# Patient Record
Sex: MALE | Race: WHITE | Employment: UNEMPLOYED | URBAN - METROPOLITAN AREA
[De-identification: names, ages, dates, MRNs, and addresses within clinical notes are randomized per-mention and may not be internally consistent; named-entity substitution may affect disease eponyms.]

---

## 2018-04-26 ENCOUNTER — HOSPITAL ENCOUNTER (EMERGENCY)
Age: 48
Discharge: HOME | End: 2018-04-26
Attending: EMERGENCY MEDICINE
Payer: SELF-PAY

## 2018-04-26 VITALS
OXYGEN SATURATION: 96 % | SYSTOLIC BLOOD PRESSURE: 144 MMHG | DIASTOLIC BLOOD PRESSURE: 108 MMHG | HEIGHT: 71 IN | BODY MASS INDEX: 26.65 KG/M2 | TEMPERATURE: 98 F | HEART RATE: 120 BPM | RESPIRATION RATE: 16 BRPM | WEIGHT: 190.38 LBS

## 2018-04-26 DIAGNOSIS — R06.02 SOB (SHORTNESS OF BREATH): Primary | ICD-10-CM

## 2018-04-26 DIAGNOSIS — R19.7 DIARRHEA, UNSPECIFIED TYPE: ICD-10-CM

## 2018-04-26 DIAGNOSIS — R25.2 MUSCLE CRAMPS: ICD-10-CM

## 2018-04-26 DIAGNOSIS — F10.10 ALCOHOL ABUSE: ICD-10-CM

## 2018-04-26 PROCEDURE — 75810000275 HC EMERGENCY DEPT VISIT NO LEVEL OF CARE

## 2018-04-26 NOTE — ED TRIAGE NOTES
Pt escorted back to room, md in with pt, pt stated if we tested his alcohol level it would be .4 something

## 2018-04-26 NOTE — Clinical Note
YOU ARE LEAVING AFTER DECLINING ANY FURTHER WORKUP OR EVALUATION BY US.  YOU SAW THE COUNSELOR WHO HAS PROVIDED YOU WITH RESOURCES TO SEEK HELP. YOU MAY RETURN TO THE ER AT ANY POINT FOR FURTHER EVALUATION.

## 2018-04-26 NOTE — ED NOTES
Sat with patient for a extended point of time trying to get pt to consent to blood work, fluids and treatment. Pt provided resources. Pt declined to stay and ambulated with mother.

## 2018-04-26 NOTE — ED TRIAGE NOTES
Pt refusing to stay, mother taking pt to [facility], pt walked out and talking to mother outside

## 2018-04-26 NOTE — ED PROVIDER NOTES
HPI Comments: 56 y.o. male with past medical history significant for HTN and elevated liver enzymes who presents from home via private vehicle for evaluation of alcohol problem. Pt reports that he is an alcoholic who has been \"harming (him)self\" (drinking) since 4/6/18, for the last 20 days. He states he is harming himself by drinking too much and baljit drinking. He notes drinking \"quite a few\" four loco's and that he last drank this morning. He reports having diarrhea, muscle cramping, and SOB (DRAPER when walking). He is concerned that he is \"bleeding out\" but has not checked his stool for signs of blood. No hematemesis. Pt expresses concern for liver failure. He denies having any medicinal allergies and is on no medications. Pt denies having any SI or HI. There are no other acute medical concerns at this time. Social Hx: Pt denies smoking and reports no illicit drug use. Note written by Carole Fernandez, as dictated by Brayden Briseno MD 8:27 AM    The history is provided by the patient. No  was used. Past Medical History:   Diagnosis Date    Elevated liver enzymes     Hypertension        History reviewed. No pertinent surgical history. History reviewed. No pertinent family history. Social History     Social History    Marital status: N/A     Spouse name: N/A    Number of children: N/A    Years of education: N/A     Occupational History    Not on file. Social History Main Topics    Smoking status: Not on file    Smokeless tobacco: Not on file    Alcohol use Not on file    Drug use: Not on file    Sexual activity: Not on file     Other Topics Concern    Not on file     Social History Narrative    No narrative on file         ALLERGIES: Review of patient's allergies indicates no known allergies. Review of Systems   Respiratory: Positive for shortness of breath. Gastrointestinal: Positive for diarrhea. Negative for vomiting. Musculoskeletal: Positive for myalgias. Psychiatric/Behavioral: Negative for suicidal ideas. All other systems reviewed and are negative. Vitals:    04/26/18 0809   BP: (!) 144/108   Pulse: (!) 120   Resp: 16   Temp: 98 °F (36.7 °C)   SpO2: 96%   Weight: 86.4 kg (190 lb 6 oz)   Height: 5' 11\" (1.803 m)            Physical Exam     Nursing note and vitals reviewed. Constitutional: appears well-developed and somewhat disheveled. No distress. HENT:   Head: Normocephalic and atraumatic. Sclera anicteric  Nose: No rhinorrhea  Eyes: nonicteric  Neurological:  Alert and oriented to person, place, and time. No gross facial assymetry. Moving all extremities. Ambulating without any ataxia. Normal speech. No slurred speech. PSYCH:  Appears somewhat anxious. Tearful at times. Linear and logical thoughts. Blunted affect. MDM    8:22 AM  Long d/w patient about risks/benefits of treatment with mother present. Pt declines exam or any further treatment. He refused me doing an exam beyond general impression and general observation. He is not slurring his words. He is alert and oriented. Not ataxic when walking. He does not appear grossly intoxicated by exam.  He verbalized understanding the risks. Mother present and agrees. Pt does consent to bsmart consult. ED Course       Procedures    9:08 AM  PT initially would maybe allow labs when long d/w RN and he, but then patient left without discharge instructions. BSMART did evaluate patient and gave pt resources.   Judie Atkinson MD

## 2018-04-26 NOTE — BSMART NOTE
Comprehensive Assessment Form Part 1      Section I - Disposition    Axis I - Alcohol Use Disorder, Substance Induced Mood Disorder   Axis II - Deferred  Axis III -   Past Medical History:   Diagnosis Date    Elevated liver enzymes     Hypertension        Axis IV - SA, Unemployed, limited support  Douglas V - 40      The Medical Doctor to Psychiatrist conference was not completed. The Medical Doctor is in agreement with Psychiatrist disposition because of (reason) Patient declining medical screening and wants to leave. The plan is discharged with multiple AA meeting times and other resources for SA treatment . The on-call Psychiatrist consulted was Dr. Sara Fowler. The admitting Psychiatrist will be Dr. Sara Fowler. The admitting Diagnosis is NA. The Payor source is self pay. Section II - Integrated Summary  Summary:  Patient came in accompanied by his mother for evaluation of alcohol use disorder. Patient reported he has been drinking \"hard\" since 2011. Patient reported in the last few days it has escalated for no specific reason that he is able to report. Patient reported \"I have anxiety and some depression. \"   No current treatment. Patient was admitted at [facility] 3x and most recent was in February. Patient did not report any medications given there other than Phenobarbitol and was discharged on no medications. Patient indicated he followed up at [address] [place] [address] but is not seeing them now. He reported he stopped going because of cost.  Patient reported no other substances that he uses. Patient is alert, oriented, and calm. Patient had reservations about coming in and was hesitant to stay. He eventually agreed to stay and talk but would not consent to examination or lab work. Patient denied any SI or HI. He did report sometimes he hears songs in the [employer]. Patient reported he is stressed about the \"damage I have done to myself\" yet was unwilling to be medically evaluated.   Extensive time was spent talking with patient by physician , this clinician, and RN to explain the benefits of being evaluated but he still chose to sign out. The patienthas demonstrated mental capacity to provide informed consent. The information is given by the patient and parent. The Chief Complaint is alcohol use. The Precipitant Factors are no specific stressor. Previous Hospitalizations: yes  Current Psychiatrist and/or  is NA. Lethality Assessment:    The potential for suicide noted by the following: current substance abuse . The potential for homicide is noted by the following : current substance abuse. The patient has not been a perpetrator of sexual or physical abuse. There are not pending charges. The patient is not felt to be at risk for self harm or harm to others. The attending nurse was advised that security has not been notified. Section III - Psychosocial  The patient's overall mood and attitude is depressed. Feelings of helplessness and hopelessness are observed by verbal statements. Generalized anxiety is not observed. Panic is not observed. Phobias are not observed. Obsessive compulsive tendencies are not observed. Section IV - Mental Status Exam  The patient's appearance shows no evidence of impairment. The patient's behavior shows no evidence of impairment. The patient is oriented to time, place, person and situation. The patient's speech shows no evidence of impairment. The patient's mood is depressed. The range of affect is flat. The patient's thought content demonstrates no evidence of impairment. The thought process shows no evidence of impairment. The patient's perception shows no evidence of impairment. The patient's memory shows no evidence of impairment. The patient's appetite is decreased and shows signs of weight loss. The patient's sleep shows no evidence of impairment. The patient shows no insight. The patient's judgement is psychologically impaired.                   Section V - Substance Abuse  The patient is using substances. The patient is using alcohol for 5-10 years with last use on this morning. The patient has experienced the following withdrawal symptoms: tremors, vomiting and diarrhea. Section VI - Living Arrangements  The patient is single. The patient lives with a parent. The patient has no children. The patient does plan to return home upon discharge. The patient does not have legal issues pending. The patient's source of income comes from family. Christian and cultural practices have not been voiced at this time. The patient's greatest support comes from mom and grandmother and this person will not be involved with the treatment. The patient has not been in an event described as horrible or outside the realm of ordinary life experience either currently or in the past.  The patient has not been a victim of sexual/physical abuse. Section VII - Other Areas of Clinical Concern  The highest grade achieved is college with the overall quality of school experience being described as NA  The patient is currently unemployed and speaks [place] as a primary language. The patient has no communication impairments affecting communication. The patient's preference for learning can be described as: can read and write adequately.   The patient's hearing is normal.  The patient's vision is normal.      Em Sheikh, YURIY

## 2024-06-14 NOTE — ED TRIAGE NOTES
Pt stated he was scared of dying, pt here with his mother, pt has been drinking and wants help, pt refusing to put arm band on, pt arguing with his mother in triage, pt denies HI or SI, officer in triage with pt, pt wants to leave no weight-bearing restrictions